# Patient Record
Sex: FEMALE | Race: WHITE | NOT HISPANIC OR LATINO | Employment: OTHER | URBAN - METROPOLITAN AREA
[De-identification: names, ages, dates, MRNs, and addresses within clinical notes are randomized per-mention and may not be internally consistent; named-entity substitution may affect disease eponyms.]

---

## 2019-02-16 ENCOUNTER — HOSPITAL ENCOUNTER (EMERGENCY)
Facility: CLINIC | Age: 29
Discharge: HOME | End: 2019-02-16
Attending: FAMILY MEDICINE | Admitting: FAMILY MEDICINE
Payer: COMMERCIAL

## 2019-02-16 VITALS
DIASTOLIC BLOOD PRESSURE: 75 MMHG | RESPIRATION RATE: 18 BRPM | HEART RATE: 120 BPM | SYSTOLIC BLOOD PRESSURE: 122 MMHG | OXYGEN SATURATION: 99 % | WEIGHT: 138 LBS | TEMPERATURE: 99.5 F

## 2019-02-16 DIAGNOSIS — J10.1 INFLUENZA A: ICD-10-CM

## 2019-02-16 DIAGNOSIS — R11.0 NAUSEA: ICD-10-CM

## 2019-02-16 LAB
FLUAV+FLUBV AG SPEC QL: NEGATIVE
FLUAV+FLUBV AG SPEC QL: POSITIVE
SPECIMEN SOURCE: ABNORMAL

## 2019-02-16 PROCEDURE — 25000132 ZZH RX MED GY IP 250 OP 250 PS 637: Performed by: FAMILY MEDICINE

## 2019-02-16 PROCEDURE — 25000131 ZZH RX MED GY IP 250 OP 636 PS 637: Performed by: FAMILY MEDICINE

## 2019-02-16 PROCEDURE — 87804 INFLUENZA ASSAY W/OPTIC: CPT | Performed by: FAMILY MEDICINE

## 2019-02-16 PROCEDURE — 99283 EMERGENCY DEPT VISIT LOW MDM: CPT | Performed by: FAMILY MEDICINE

## 2019-02-16 PROCEDURE — 99284 EMERGENCY DEPT VISIT MOD MDM: CPT | Mod: Z6 | Performed by: FAMILY MEDICINE

## 2019-02-16 RX ORDER — IBUPROFEN 600 MG/1
600 TABLET, FILM COATED ORAL ONCE
Status: COMPLETED | OUTPATIENT
Start: 2019-02-16 | End: 2019-02-16

## 2019-02-16 RX ORDER — PRENATAL VIT/IRON FUM/FOLIC AC 27MG-0.8MG
1 TABLET ORAL DAILY
COMMUNITY

## 2019-02-16 RX ORDER — CHOLECALCIFEROL (VITAMIN D3) 50 MCG
1 TABLET ORAL DAILY
COMMUNITY

## 2019-02-16 RX ORDER — ACETAMINOPHEN 500 MG
1000 TABLET ORAL ONCE
Status: COMPLETED | OUTPATIENT
Start: 2019-02-16 | End: 2019-02-16

## 2019-02-16 RX ORDER — ONDANSETRON 4 MG/1
4 TABLET, ORALLY DISINTEGRATING ORAL EVERY 6 HOURS PRN
Qty: 12 TABLET | Refills: 0 | Status: SHIPPED | OUTPATIENT
Start: 2019-02-16 | End: 2019-02-21

## 2019-02-16 RX ORDER — ONDANSETRON 4 MG/1
4 TABLET, ORALLY DISINTEGRATING ORAL ONCE
Status: COMPLETED | OUTPATIENT
Start: 2019-02-16 | End: 2019-02-16

## 2019-02-16 RX ADMIN — IBUPROFEN 600 MG: 600 TABLET ORAL at 21:53

## 2019-02-16 RX ADMIN — ONDANSETRON 4 MG: 4 TABLET, ORALLY DISINTEGRATING ORAL at 21:44

## 2019-02-16 RX ADMIN — ACETAMINOPHEN 1000 MG: 500 TABLET ORAL at 21:53

## 2019-02-16 SDOH — HEALTH STABILITY: MENTAL HEALTH: HOW OFTEN DO YOU HAVE A DRINK CONTAINING ALCOHOL?: NEVER

## 2019-02-17 NOTE — ED PROVIDER NOTES
History     Chief Complaint   Patient presents with     Flu Symptoms     HPI  Leatha Trujillo is a 28 year old female who resents to the ED tonight with scratchy throat nasal congestion and cough.  This started last night with the scratchy throat and some nasal congestion.  She had just started some ofloxacin eardrops just prior to that for external otitis and was wondering if she was having a reaction to the medication so she took some Benadryl.  She readily admits to significant anxiety and tries to sort out what is real and what is due to her anxiety.  Today she has been more achy.  She took a bath and then her  made her some soup and tea and that seemed to loosen things up in her chest and she was coughing up a moderate amount of sputum to the point of gagging.  She has no underlying lung problems.  Kids a bit scratchy throats as well.  She is never had strep and is not worried about it but is concerned she could have influenza.  Her biggest complaint is the nausea.  She very much dislikes needles and was hoping to be able to hydrate orally if she can get some nausea medication.    Allergies:  Allergies   Allergen Reactions     Keflex [Cephalexin]      Neomycin      Penicillins      Steri Strips        Problem List:    There are no active problems to display for this patient.       Past Medical History:    Past Medical History:   Diagnosis Date     Anxiety        Past Surgical History:    History reviewed. No pertinent surgical history.    Family History:    History reviewed. No pertinent family history.    Social History:  Marital Status:    Social History     Tobacco Use     Smoking status: Never Smoker     Smokeless tobacco: Never Used   Substance Use Topics     Alcohol use: No     Frequency: Never     Drug use: No        Medications:      ondansetron (ZOFRAN ODT) 4 MG ODT tab   Prenatal Vit-Fe Fumarate-FA (PRENATAL MULTIVITAMIN W/IRON) 27-0.8 MG tablet   vitamin D3 (CHOLECALCIFEROL) 2000 units  tablet         Review of Systems   All other systems reviewed and are negative.      Physical Exam   BP: 122/75  Pulse: 120  Temp: 99.5  F (37.5  C)  Resp: 18  Weight: 62.6 kg (138 lb)  SpO2: 99 %      Physical Exam   Constitutional: She is oriented to person, place, and time. She appears well-developed and well-nourished. No distress.   HENT:   Head: Normocephalic.   Mouth/Throat: Oropharynx is clear and moist.   Eyes: EOM are normal.   Neck: Neck supple.   Cardiovascular: Regular rhythm. Tachycardia present.   Pulmonary/Chest: Effort normal and breath sounds normal. No respiratory distress.   Musculoskeletal: Normal range of motion.   Neurological: She is alert and oriented to person, place, and time. No cranial nerve deficit.   Skin: Skin is warm and dry.   Psychiatric: Her mood appears anxious ( mild).       ED Course  (with Medical Decision Making)    28-year-old with a 1 day history of nasal congestion and scratchy throat along with nausea.  Cough loosened up this evening and she brought up a fair amount of phlegm to the point of gagging.  Some achiness and mild headache.  Nausea is her most concerning symptom.    Influenza was sent.  She was given Zofran ODT and then can try oral hydration.  Her exam is reassuring.  She appears adequately hydrated and her lungs are nice and clear.  She is a little bit anxious which she readily admits to.  Heart rate is up a bit at 120.  Will try oral hydration.  I think we can hold off on an IV and blood work for now.      Influenza came back positive for influenza A.  She feels much better after the Zofran ODT and was able to tolerate oral liquids.  She feels improved and wishes to go home.  Verbal and written discharge instructions given.  Reportable signs discussed.          Procedures               Critical Care time:  none               Results for orders placed or performed during the hospital encounter of 02/16/19 (from the past 24 hour(s))   Influenza A/B antigen    Result Value Ref Range    Influenza A/B Agn Specimen Nasopharyngeal     Influenza A Positive (A) NEG^Negative    Influenza B Negative NEG^Negative       Medications   ondansetron (ZOFRAN-ODT) ODT tab 4 mg (4 mg Oral Given 2/16/19 2144)   ibuprofen (ADVIL/MOTRIN) tablet 600 mg (600 mg Oral Given 2/16/19 2153)   acetaminophen (TYLENOL) tablet 1,000 mg (1,000 mg Oral Given 2/16/19 2153)       Assessments & Plan     I have reviewed the nursing notes.    I have reviewed the findings, diagnosis, plan and need for follow up with the patient.          Medication List      Started    ondansetron 4 MG ODT tab  Commonly known as:  ZOFRAN ODT  4 mg, Oral, EVERY 6 HOURS PRN            Final diagnoses:   Influenza A   Nausea       2/16/2019   [facility] EMERGENCY DEPARTMENT     Felipe Murphy MD  02/16/19 0358

## 2019-02-17 NOTE — DISCHARGE INSTRUCTIONS
Zofran ODT as needed for nausea.  Encourage fluids and advance diet as tolerated.  Tylenol/ibuprofen as needed for fever, discomfort.  Good handwashing.  It was nice visiting with you this evening.  I am glad you are feeling better and hope you continue to improve.  I hope the rest of the family is able to avoid becoming ill.    Thank you for choosing [facility]. We appreciate the opportunity to meet your urgent medical needs. Please let us know if we could have done anything to make your stay more satisfying.    After discharge, please closely monitor for any new or worsening symptoms. Return to the Emergency Department if you develop any acute worsening signs or symptoms.    If you had lab work, cultures or imaging studies done during your stay, the final results may still be pending. We will call you if your plan of care needs to change. However, if you are not improving as expected, please follow up with your primary care provider or clinic.     Start any prescription medications that were prescribed to you and take them as directed.     Please see additional handouts that may be pertinent to your condition.

## 2019-02-17 NOTE — ED NOTES
"Pt feeling anxious, stating \"I don't want to die.\"  Reassured pt about what she is feeling.  Pt discussing her anxiety, tingling in her arms and legs, increased respiratory rate, and rocking in the chair.  Discussed things to try to help her relax.  Pt sipping on water.    "

## 2024-03-05 ENCOUNTER — APPOINTMENT (OUTPATIENT)
Dept: CT IMAGING | Facility: CLINIC | Age: 34
End: 2024-03-05
Attending: STUDENT IN AN ORGANIZED HEALTH CARE EDUCATION/TRAINING PROGRAM
Payer: COMMERCIAL

## 2024-03-05 ENCOUNTER — HOSPITAL ENCOUNTER (EMERGENCY)
Facility: CLINIC | Age: 34
Discharge: HOME | End: 2024-03-05
Attending: STUDENT IN AN ORGANIZED HEALTH CARE EDUCATION/TRAINING PROGRAM | Admitting: STUDENT IN AN ORGANIZED HEALTH CARE EDUCATION/TRAINING PROGRAM
Payer: COMMERCIAL

## 2024-03-05 VITALS
HEIGHT: 65 IN | SYSTOLIC BLOOD PRESSURE: 120 MMHG | BODY MASS INDEX: 22.49 KG/M2 | OXYGEN SATURATION: 99 % | WEIGHT: 135 LBS | RESPIRATION RATE: 16 BRPM | DIASTOLIC BLOOD PRESSURE: 73 MMHG | TEMPERATURE: 98.2 F | HEART RATE: 89 BPM

## 2024-03-05 DIAGNOSIS — F07.81 POST CONCUSSION SYNDROME: ICD-10-CM

## 2024-03-05 DIAGNOSIS — S09.90XA CLOSED HEAD INJURY, INITIAL ENCOUNTER: ICD-10-CM

## 2024-03-05 PROCEDURE — 70450 CT HEAD/BRAIN W/O DYE: CPT

## 2024-03-05 PROCEDURE — 99284 EMERGENCY DEPT VISIT MOD MDM: CPT | Mod: 25

## 2024-03-05 PROCEDURE — 72125 CT NECK SPINE W/O DYE: CPT

## 2024-03-05 PROCEDURE — 99284 EMERGENCY DEPT VISIT MOD MDM: CPT | Performed by: STUDENT IN AN ORGANIZED HEALTH CARE EDUCATION/TRAINING PROGRAM

## 2024-03-05 ASSESSMENT — ACTIVITIES OF DAILY LIVING (ADL)
ADLS_ACUITY_SCORE: 35
ADLS_ACUITY_SCORE: 33
ADLS_ACUITY_SCORE: 35

## 2024-03-05 ASSESSMENT — COLUMBIA-SUICIDE SEVERITY RATING SCALE - C-SSRS
6. HAVE YOU EVER DONE ANYTHING, STARTED TO DO ANYTHING, OR PREPARED TO DO ANYTHING TO END YOUR LIFE?: NO
2. HAVE YOU ACTUALLY HAD ANY THOUGHTS OF KILLING YOURSELF IN THE PAST MONTH?: NO
1. IN THE PAST MONTH, HAVE YOU WISHED YOU WERE DEAD OR WISHED YOU COULD GO TO SLEEP AND NOT WAKE UP?: NO

## 2024-03-05 NOTE — ED PROVIDER NOTES
History     Chief Complaint   Patient presents with    Head Injury     HPI  Leatha Trujillo is a 33 year old female with history of anxiety who presents for evaluation after head injury.  Patient was snowboarding on Saturday and sustained a fall.  She struck the left side of her head and also fell onto an outstretched left arm.  Patient denies any loss of consciousness or anticoagulant use.  However, she did experience a significant headache at that time and also severe left shoulder pain.  Patient was able to get down the hill and eventually went home, went to sleep without significant difficulty.  She was seen at an orthopedic clinic on Sunday and was ultimately diagnosed with a nondisplaced left shoulder fracture.  Shoulder sling was placed at that time and she was given 2-week weight restriction/follow-up instructions.  Since then she has also had an intermittent generalized headache.  Last night she also had some temporary numbness to the left side of her body, but she attributed that to significant baseline anxiety.  Finally, she has some intermittent dizziness with position changes and some light sensitivity.  Patient otherwise denies fevers, sustained vertigo, issues with balance or coordination, current focal numbness/tingling/weakness, chest pain or shortness of breath, vomiting, other complaints today.    Allergies:  Allergies   Allergen Reactions    Keflex [Cephalexin]     Neomycin     Penicillins     Steri Strips        Problem List:    There are no problems to display for this patient.     Past Medical History:    Past Medical History:   Diagnosis Date    Anxiety        Past Surgical History:    History reviewed. No pertinent surgical history.    Family History:    History reviewed. No pertinent family history.    Social History:  Marital Status:   [2]  Social History     Tobacco Use    Smoking status: Never    Smokeless tobacco: Never   Substance Use Topics    Alcohol use: No    Drug use: No  "       Medications:    Prenatal Vit-Fe Fumarate-FA (PRENATAL MULTIVITAMIN W/IRON) 27-0.8 MG tablet  vitamin D3 (CHOLECALCIFEROL) 2000 units tablet      Review of Systems   All other systems reviewed and are negative.  See HPI.    Physical Exam   BP: 120/73  Pulse: 89  Temp: 98.2  F (36.8  C)  Resp: 16  Height: 163.8 cm (5' 4.5\")  Weight: 61.2 kg (135 lb)  SpO2: 99 %      Physical Exam  Vitals and nursing note reviewed.   Constitutional:       General: She is not in acute distress.     Appearance: Normal appearance. She is not ill-appearing, toxic-appearing or diaphoretic.   HENT:      Head: Atraumatic.      Comments: No evidence of head or neck injury.  No palpable skull deformities.  No facial instability.     Right Ear: Tympanic membrane and ear canal normal.      Left Ear: Tympanic membrane and ear canal normal.      Nose: Nose normal. No rhinorrhea.      Mouth/Throat:      Mouth: Mucous membranes are moist.      Pharynx: Oropharynx is clear. No oropharyngeal exudate.   Eyes:      General: No scleral icterus.     Extraocular Movements: Extraocular movements intact.      Conjunctiva/sclera: Conjunctivae normal.      Pupils: Pupils are equal, round, and reactive to light.      Comments: No nystagmus.  Reported photophobia, but pupils are equal and reactive.  No pain with extraocular movements or proptosis.   Neck:      Comments: Very mild left paraspinal tenderness, no midline tenderness or bony abnormality/step-off.  Cardiovascular:      Rate and Rhythm: Normal rate and regular rhythm.      Pulses: Normal pulses.      Heart sounds: Normal heart sounds.   Pulmonary:      Effort: No respiratory distress.      Breath sounds: Normal breath sounds.   Abdominal:      General: Abdomen is flat.      Tenderness: There is no guarding.   Musculoskeletal:      Cervical back: Normal range of motion and neck supple. Tenderness present.      Comments: Left shoulder in sling.  Known nondisplaced fracture.   Skin:     General: " Skin is warm.      Capillary Refill: Capillary refill takes less than 2 seconds.      Findings: No rash.   Neurological:      General: No focal deficit present.      Mental Status: She is alert and oriented to person, place, and time.      Cranial Nerves: No cranial nerve deficit.      Sensory: No sensory deficit.      Motor: No weakness.      Coordination: Coordination normal.      Gait: Gait normal.      Comments: Cranial nerve exam grossly intact.  Moving all extremities spontaneously with equal strength throughout.  Normal finger-nose-finger.  Gait unremarkable.   Psychiatric:         Mood and Affect: Mood normal.         ED Course        Procedures              Results for orders placed or performed during the hospital encounter of 03/05/24 (from the past 24 hour(s))   CT Head w/o Contrast    Narrative    EXAM: CT HEAD W/O CONTRAST  3/5/2024 10:42 AM     HISTORY:  Left temporal head injury snowboarding 3 days ago       COMPARISON:  No prior similar studies    TECHNIQUE: Using multidetector thin collimation helical acquisition  technique, axial, coronal and sagittal CT images from the skull base  to the vertex were obtained without intravenous contrast.   (topogram) image(s) also obtained and reviewed. Dose reduction  techniques were performed.    FINDINGS:  No intracranial hemorrhage, mass effect, or midline shift. No acute  loss of gray-white matter differentiation in the cerebral hemispheres.  Ventricles are proportionate to the cerebral sulci. Clear basal  cisterns.    The bony calvaria and the bones of the skull base are normal. Mucous  retention cyst in the left maxillary sinus. The remaining paranasal  sinuses and mastoid air cells are clear. Grossly normal orbits.       Impression    IMPRESSION: No acute intracranial pathology.     GINNY BALDERRAMA MD         SYSTEM ID:  [id number]   CT Cervical Spine w/o Contrast    Narrative    CT CERVICAL SPINE WITHOUT CONTRAST  3/5/2024 10:43 AM     HISTORY: Left  temporal head injury snowboarding 3 days ago,  distracting shoulder injury as well. Neck pain. Mild/moderate trauma.     TECHNIQUE: Axial images of the cervical spine were obtained without  intravenous contrast. Multiplanar reformations were performed.   Radiation dose for this scan was reduced using automated exposure  control, adjustment of the mA and/or kV according to patient size, or  iterative reconstruction technique.    COMPARISON: None.    FINDINGS: No evidence of acute fracture or posttraumatic subluxation.  No high-grade spinal canal or foraminal stenosis. Slight reversal of  the normal cervical lordosis. No appreciable extraspinal abnormally.      Impression    IMPRESSION: No evidence of acute fracture or subluxation in the  cervical spine.    WINIFRED CASTRO MD         SYSTEM ID:  [id number]       Medications - No data to display    Assessments & Plan (with Medical Decision Making)     I have reviewed the nursing notes.    I have reviewed the findings, diagnosis, plan and need for follow up with the patient.    Medical Decision Making  Leatha Trujillo is a 33 year old female with history of anxiety who presents for evaluation after head injury.  Patient was snowboarding on Saturday and sustained a fall.  Normal vitals on arrival.  Exam is very reassuring.  She has a left shoulder sling in place from previous visit and known nondisplaced fracture.  Neurological exam is nonfocal.  Cranial nerves are intact and despite description of photophobia, she has no pain with extraocular movements, pupils are equal and reactive, no nystagmus.  Strength is equal throughout.  Normal finger-nose-finger and gait.  Her symptoms seem most consistent with a concussion, though theoretically a small brain bleed could have similar symptoms.  With presence of distracting injury and persistent symptoms, CT scans of the head and cervical spine were obtained.  There were some delays in obtaining these studies due to backlog in  the CT scanner this morning.  No acute intracranial abnormalities or injury to the cervical spine were seen.  Discussed results with the patient and that symptoms are probably due to postconcussive syndrome.  Recommended monitoring symptoms closely and avoiding triggers as best as possible.  Offered to prescribe Zofran, but she declined, as this has not worked well for her in the past.  She will follow-up with her primary care doctor and orthopedist.  She agrees to return sooner for any new or acutely worsening symptoms.    Discharge Medication List as of 3/5/2024 11:53 AM          Final diagnoses:   Post concussion syndrome   Closed head injury, initial encounter       3/5/2024   [facility] EMERGENCY DEPT       Aj Mills MD  03/05/24 1640

## 2024-03-05 NOTE — Clinical Note
Leatha Trujillo was seen and treated in our emergency department on 3/5/2024.  She may return to work on 03/05/2024.       If you have any questions or concerns, please don't hesitate to call.      Aj Mills MD

## 2024-03-05 NOTE — DISCHARGE INSTRUCTIONS
Your CT scans were very reassuring.  I do not see any signs of internal injury to the head or neck region.  You are most likely dealing with symptoms of a concussion and this will likely continue for the next week or so.  Do your best to identify and avoid triggers that make your symptoms worse.  Use Tylenol/ibuprofen for discomfort.  Rest and drink plenty of fluids.  Follow-up with your primary care doctor for recheck.  Return to the emergency department in the meantime for any new or acutely worsening symptoms.

## 2024-03-05 NOTE — ED TRIAGE NOTES
Pt snowboarding Saturday and hit head, has since had increased HA and dizziness, eye pressure and photophobia, and numbness and tingling on face, left arm and left leg. Was seen at ortho urgent care for shoulder fracture.      Triage Assessment (Adult)       Row Name 03/05/24 0864          Triage Assessment    Airway WDL WDL        Respiratory WDL    Respiratory WDL WDL        Peripheral/Neurovascular WDL    Peripheral Neurovascular WDL WDL